# Patient Record
Sex: MALE | Race: BLACK OR AFRICAN AMERICAN | NOT HISPANIC OR LATINO | URBAN - NONMETROPOLITAN AREA
[De-identification: names, ages, dates, MRNs, and addresses within clinical notes are randomized per-mention and may not be internally consistent; named-entity substitution may affect disease eponyms.]

---

## 2024-07-10 ENCOUNTER — OFFICE (OUTPATIENT)
Dept: URBAN - NONMETROPOLITAN AREA CLINIC 5 | Facility: CLINIC | Age: 44
End: 2024-07-10
Payer: COMMERCIAL

## 2024-07-10 VITALS
DIASTOLIC BLOOD PRESSURE: 80 MMHG | RESPIRATION RATE: 19 BRPM | HEART RATE: 49 BPM | SYSTOLIC BLOOD PRESSURE: 116 MMHG | WEIGHT: 163 LBS | HEIGHT: 63 IN

## 2024-07-10 DIAGNOSIS — R74.8 ABNORMAL LEVELS OF OTHER SERUM ENZYMES: ICD-10-CM

## 2024-07-10 PROCEDURE — 99203 OFFICE O/P NEW LOW 30 MIN: CPT | Performed by: INTERNAL MEDICINE

## 2024-07-10 NOTE — SERVICEHPINOTES
Fransico Ballesteros   is a   43 yo  male   with a past medical history of HTN, Down syndrome, and CKD who presents to establish care for elevated alkaline phosphatase.  In review patient had recent blood work which revealed an alkaline phosphatase of 148 ( upper limits arrange normal 119).  AST, ALT, T bili were all unremarkable.  Patient was accompanied by his aunt today.  She denies him previously being told of elevated liver enzymes.  Denies any family history of liver disease.  He does not smoke, drink alcohol, use illicit drugs, or have prior tattoos.  He has not had any imaging of his liver.

## 2024-07-10 NOTE — SERVICENOTES
Given patient's mildly elevated alkaline phosphatase will plan to repeat now.  Will also obtain isoenzymes and BNP.  Will proceed with liver ultrasound.  If enzymes remain elevated will proceed with serologic workup to rule out other causes.